# Patient Record
(demographics unavailable — no encounter records)

---

## 2025-05-01 NOTE — HISTORY OF PRESENT ILLNESS
[FreeTextEntry1] : Valeri is a 65-year-old with hx of POP and incomplete bladder emptying.  She currently has a RS #5 in place.  While she finds the RS #5 supportive, she has been experiencing leakage of urine with activity and sneezing which she is finding increasingly bothersome.  She feels empty after voiding.  No vaginal bleeding.  No urinary or bowel habit complaints.  She is using transvaginal estrogen cream twice weekly as prescribed.     She does not perform selfcare; she follows with her general GYN who is closer to her home.

## 2025-05-01 NOTE — DISCUSSION/SUMMARY
[FreeTextEntry1] : #POP  -Refit with RS#5 with knob; patient knows how to assess the knob to ensure it is in the correct spot. -She will continue for follow with her GYN for ongoing psc care. -Continue with estrogen cream twice weekly. -RTO in 3 months or sooner if needed  All questions answered to the patient's satisfaction.  She expressed understanding. She knows to contact the office with any questions or concerns.

## 2025-05-01 NOTE — PROCEDURE
[Good Fit] : fit is not good [Refit] : refit needed [Erosion] : no evidence of erosion [Erythema] : no erythema [Discharge] : there is vaginal discharge [Infection] : no evidence of infection [Pessary Inserted] : inserted [Pessary Out] : removed [Pessary Washed] : washed [None] : no bleeding [Medication Review] : Medicaiton Review: Patient verbalizes understanding of risks and benefits [FreeTextEntry1] : RS #5  [de-identified] : RS #5 with knob [FreeTextEntry3] : Continue with vaginal estrogen cream twice a week at bedtime  [FreeTextEntry8] : Routine perineal care reviewed

## 2025-05-01 NOTE — PHYSICAL EXAM
[No Acute Distress] : in no acute distress [Well developed] : well developed [Well Nourished] : ~L well nourished [Good Hygeine] : demonstrates good hygeine [Oriented x3] : oriented to person, place, and time [Normal Memory] : ~T memory was ~L unimpaired [Normal Mood/Affect] : mood and affect are normal [Anxiety] : patient is not anxious [Warm and Dry] : was warm and dry to touch [Normal Gait] : gait was normal [Labia Majora] : were normal [Labia Minora] : were normal [Normal] : was normal [Normal Appearance] : general appearance was normal [Atrophy] : atrophy [No Bleeding] : there was no active vaginal bleeding [Post Void Residual ____ml] : post void residual was [unfilled] ml [de-identified] : via bladder scan, before pessary insertion